# Patient Record
Sex: FEMALE | Race: WHITE
[De-identification: names, ages, dates, MRNs, and addresses within clinical notes are randomized per-mention and may not be internally consistent; named-entity substitution may affect disease eponyms.]

---

## 2018-01-05 ENCOUNTER — HOSPITAL ENCOUNTER (EMERGENCY)
Dept: HOSPITAL 62 - ER | Age: 38
Discharge: HOME | End: 2018-01-05
Payer: SELF-PAY

## 2018-01-05 VITALS — DIASTOLIC BLOOD PRESSURE: 84 MMHG | SYSTOLIC BLOOD PRESSURE: 117 MMHG

## 2018-01-05 DIAGNOSIS — R11.2: Primary | ICD-10-CM

## 2018-01-05 LAB
ADD MANUAL DIFF: NO
ALBUMIN SERPL-MCNC: 4.9 G/DL (ref 3.5–5)
ALP SERPL-CCNC: 77 U/L (ref 38–126)
ALT SERPL-CCNC: 23 U/L (ref 9–52)
ANION GAP SERPL CALC-SCNC: 15 MMOL/L (ref 5–19)
AST SERPL-CCNC: 27 U/L (ref 14–36)
BASOPHILS # BLD AUTO: 0 10^3/UL (ref 0–0.2)
BASOPHILS NFR BLD AUTO: 0.3 % (ref 0–2)
BILIRUB DIRECT SERPL-MCNC: 0.2 MG/DL (ref 0–0.4)
BILIRUB SERPL-MCNC: 0.5 MG/DL (ref 0.2–1.3)
BUN SERPL-MCNC: 12 MG/DL (ref 7–20)
CALCIUM: 10.8 MG/DL (ref 8.4–10.2)
CHLORIDE SERPL-SCNC: 100 MMOL/L (ref 98–107)
CO2 SERPL-SCNC: 26 MMOL/L (ref 22–30)
EOSINOPHIL # BLD AUTO: 0 10^3/UL (ref 0–0.6)
EOSINOPHIL NFR BLD AUTO: 0.2 % (ref 0–6)
ERYTHROCYTE [DISTWIDTH] IN BLOOD BY AUTOMATED COUNT: 14.3 % (ref 11.5–14)
GLUCOSE SERPL-MCNC: 90 MG/DL (ref 75–110)
HCT VFR BLD CALC: 43.1 % (ref 36–47)
HGB BLD-MCNC: 14.9 G/DL (ref 12–15.5)
LYMPHOCYTES # BLD AUTO: 2.8 10^3/UL (ref 0.5–4.7)
LYMPHOCYTES NFR BLD AUTO: 29.4 % (ref 13–45)
MCH RBC QN AUTO: 29.1 PG (ref 27–33.4)
MCHC RBC AUTO-ENTMCNC: 34.7 G/DL (ref 32–36)
MCV RBC AUTO: 84 FL (ref 80–97)
MONOCYTES # BLD AUTO: 0.4 10^3/UL (ref 0.1–1.4)
MONOCYTES NFR BLD AUTO: 4.3 % (ref 3–13)
NEUTROPHILS # BLD AUTO: 6.4 10^3/UL (ref 1.7–8.2)
NEUTS SEG NFR BLD AUTO: 65.8 % (ref 42–78)
PLATELET # BLD: 276 10^3/UL (ref 150–450)
POTASSIUM SERPL-SCNC: 4.4 MMOL/L (ref 3.6–5)
PROT SERPL-MCNC: 9.1 G/DL (ref 6.3–8.2)
RBC # BLD AUTO: 5.13 10^6/UL (ref 3.72–5.28)
SODIUM SERPL-SCNC: 141.1 MMOL/L (ref 137–145)
TOTAL CELLS COUNTED % (AUTO): 100 %
WBC # BLD AUTO: 9.7 10^3/UL (ref 4–10.5)

## 2018-01-05 PROCEDURE — 36415 COLL VENOUS BLD VENIPUNCTURE: CPT

## 2018-01-05 PROCEDURE — 85025 COMPLETE CBC W/AUTO DIFF WBC: CPT

## 2018-01-05 PROCEDURE — 99284 EMERGENCY DEPT VISIT MOD MDM: CPT

## 2018-01-05 PROCEDURE — 96374 THER/PROPH/DIAG INJ IV PUSH: CPT

## 2018-01-05 PROCEDURE — 96375 TX/PRO/DX INJ NEW DRUG ADDON: CPT

## 2018-01-05 PROCEDURE — 80053 COMPREHEN METABOLIC PANEL: CPT

## 2018-01-05 PROCEDURE — 96361 HYDRATE IV INFUSION ADD-ON: CPT

## 2018-01-18 ENCOUNTER — HOSPITAL ENCOUNTER (EMERGENCY)
Dept: HOSPITAL 62 - ER | Age: 38
Discharge: HOME | End: 2018-01-18
Payer: SELF-PAY

## 2018-01-18 VITALS — DIASTOLIC BLOOD PRESSURE: 89 MMHG | SYSTOLIC BLOOD PRESSURE: 124 MMHG

## 2018-01-18 DIAGNOSIS — R11.2: ICD-10-CM

## 2018-01-18 DIAGNOSIS — K08.89: Primary | ICD-10-CM

## 2018-01-18 DIAGNOSIS — F17.200: ICD-10-CM

## 2018-01-18 PROCEDURE — 99282 EMERGENCY DEPT VISIT SF MDM: CPT

## 2018-01-18 NOTE — ER DOCUMENT REPORT
ED General





- General


Chief Complaint: Toothache


Stated Complaint: NAUSEA,VOMITING


TRAVEL OUTSIDE OF THE [place] IN LAST 30 DAYS: No





- Related Data


Allergies/Adverse Reactions: 


 





ondansetron [From Zofran (as hydrochloride)] Allergy (Intermediate, Verified 01/ 18/18 10:49)


 Hives


acetaminophen [From Darvocet-N] Allergy (Verified 01/18/18 10:49)


 


meperidine [From Demerol] Allergy (Verified 01/18/18 10:49)


 


metoclopramide [From Reglan] Allergy (Verified 01/18/18 10:49)


 


propoxyphene [From Darvocet-N] Allergy (Verified 01/18/18 10:49)


 


erythromycin base [From E-Mycin] Adverse Reaction (Verified 01/18/18 10:49)


 











Past Medical History





- Social History


Smoking Status: Current Every Day Smoker


Chew tobacco use (# tins/day): No


Frequency of alcohol use: Occasional


Drug Abuse: Marijuana


Family History: Reviewed & Not Pertinent


Patient has suicidal ideation: No


Patient has homicidal ideation: No


Neurological Medical History: Reports: Hx Migraine


Renal/ Medical History: Denies: Hx Peritoneal Dialysis


Past Surgical History: Reports: Hx Appendectomy, Hx Cholecystectomy, Hx 

Hysterectomy, Hx Tonsillectomy





Physical Exam





- Vital signs


Vitals: 





 











Temp Pulse Resp BP Pulse Ox


 


 98.6 F   93   14   124/89 H  96 


 


 01/18/18 10:53  01/18/18 10:53  01/18/18 10:53  01/18/18 10:53  01/18/18 10:53














Course





- Vital Signs


Vital signs: 





 











Temp Pulse Resp BP Pulse Ox


 


 98.6 F   93   14   124/89 H  96 


 


 01/18/18 10:53  01/18/18 10:53  01/18/18 10:53  01/18/18 10:53  01/18/18 10:53














Discharge





- Discharge


Clinical Impression: 


 Pain, dental, Nausea





Condition: Stable


Disposition: HOME, SELF-CARE


Instructions:  Toothache ([facility])


Prescriptions: 


Penicillin V Potassium [Penicillin Vk 500 mg Tablet] 500 mg PO Q6 #40 tablet

## 2018-01-22 ENCOUNTER — HOSPITAL ENCOUNTER (EMERGENCY)
Dept: HOSPITAL 62 - ER | Age: 38
Discharge: HOME | End: 2018-01-22
Payer: SELF-PAY

## 2018-01-22 VITALS — SYSTOLIC BLOOD PRESSURE: 109 MMHG | DIASTOLIC BLOOD PRESSURE: 83 MMHG

## 2018-01-22 DIAGNOSIS — Z88.6: ICD-10-CM

## 2018-01-22 DIAGNOSIS — Z90.49: ICD-10-CM

## 2018-01-22 DIAGNOSIS — R03.0: ICD-10-CM

## 2018-01-22 DIAGNOSIS — K02.9: ICD-10-CM

## 2018-01-22 DIAGNOSIS — M77.32: Primary | ICD-10-CM

## 2018-01-22 DIAGNOSIS — Z90.710: ICD-10-CM

## 2018-01-22 DIAGNOSIS — F17.200: ICD-10-CM

## 2018-01-22 DIAGNOSIS — Z88.3: ICD-10-CM

## 2018-01-22 PROCEDURE — 99283 EMERGENCY DEPT VISIT LOW MDM: CPT

## 2018-01-22 NOTE — ER DOCUMENT REPORT
ED General





- General


Chief Complaint: Toothache


Stated Complaint: TOOTH/FOOT PAIN


Time Seen by Provider: 01/22/18 18:35


Mode of Arrival: Ambulatory


Information source: Patient


TRAVEL OUTSIDE OF THE [place] IN LAST 30 DAYS: No





- HPI


Onset: Other - few days


Quality of pain: Achy


Severity: Moderate


Notes: 





Patient arrives with 2 complaints today.  She was seen a few days ago for some 

dental pain was placed on penicillin and states that the abscesses in the gum 

since drained and she wanted to have these rechecked.  States that this seem to 

be improving.  She denies any difficulty breathing or swallowing.  She denies 

any fevers.  She also reports some heel pain in the left heel for last few 

days.  She denies any traumatic injuries or falls.  She denies any redness or 

swelling to this area.  She denies any nausea, vomiting, diarrhea.  She denies 

any chest pain or shortness of breath.  Pain is worse in her heel when she 

walks or touches the area, nothing seems to make it better.  She denies any 

other complaints at this time.





- Related Data


Allergies/Adverse Reactions: 


 





ondansetron [From Zofran (as hydrochloride)] Allergy (Intermediate, Verified 01/ 18/18 10:49)


 Hives


acetaminophen [From Darvocet-N] Allergy (Verified 01/18/18 10:49)


 


meperidine [From Demerol] Allergy (Verified 01/18/18 10:49)


 


metoclopramide [From Reglan] Allergy (Verified 01/18/18 10:49)


 


propoxyphene [From Darvocet-N] Allergy (Verified 01/18/18 10:49)


 


erythromycin base [From E-Mycin] Adverse Reaction (Verified 01/18/18 10:49)


 











Past Medical History





- Social History


Smoking Status: Current Every Day Smoker


Family History: Reviewed & Not Pertinent


Neurological Medical History: Reports: Hx Migraine


Renal/ Medical History: Denies: Hx Peritoneal Dialysis


Past Surgical History: Reports: Hx Appendectomy, Hx Cholecystectomy, Hx 

Hysterectomy, Hx Tonsillectomy





Review of Systems





- Review of Systems


-: Yes All other systems reviewed and negative





Physical Exam





- Vital signs


Vitals: 





 











Temp Pulse Resp BP Pulse Ox


 


 98.6 F   90   18   120/81   97 


 


 01/22/18 18:09  01/22/18 18:09  01/22/18 18:09  01/22/18 18:09  01/22/18 18:09














- Notes


Notes: 





GENERAL: alert, cooperative, nontoxic, no distress.


HEAD: normocephalic, atraumatic


EYES: conjunctiva pink without discharge, no external redness or swelling.


EARS: no external swelling, no external redness


NOSE: atraumatic, no external swelling


MOUTH/THROAT: mucous membranes moist and pink.  Widespread dental decay.  No 

abscess identified.  No facial swelling.  No trismus or drooling.


NECK: soft, supple, full range of motion, no meningismus.


CHEST: no distress, lungs clear and equal throughout.  No wheezing, rales, 

rhonchi.


CARDIAC: regular rate and rhythm, no murmur, normal capillary refill, normal 

pulses.  


BACK: full range of motion, no CVA tenderness.


EXTREMITIES: full range of motion of all extremities.  No redness, no swelling.

  Tenderness to palpation of the heel.  There is no redness or swelling.  Full 

range of motion.  No tenderness to palpation to the dorsum of the foot or the 

ankle.  Normal pulse and sensation to the foot and normal capillary refill.  No 

foreign body identified.


NEURO: alert and oriented 3, no focal deficits, full range of motion of all 

extremities.


PYSCH: appropriate mood, affect.  Patient is cooperative.


SKIN: pink, warm, dry, no rash.








Course





- Re-evaluation


Re-evalutation: 





01/22/18 19:24


The patient is nontoxic appearing with stable vitals.  The patient arrives for 

recheck of her dental pain.  She is currently on antibiotics and states that 

she feels like she is getting better and her exam is benign aside from 

widespread dental decay.  There is no distress or signs of worsening abscess at 

this time.  She also complains of some left heel pain.  Skin exam is benign 

with no redness or swelling.  Neurovascularly she is intact.  Compartments are 

soft.  X-ray shows a small heel spur with no other acute abnormalities.  This 

is the likely source of her pain.  She will be discharged home on NSAIDs.  

Follow-up if not improving the next week, sooner for increased pain, fever, 

redness, swelling, any further concerns.





The patient is noted to have elevated blood pressure during today's emergency 

department visit.  The patient was informed of this finding.  The patient was 

instructed that this may be related to pre-hypertension and requires further 

evaluation with a primary care provider.  The patient has no hypertensive 

symptoms at this time.





The patient's emergency department workup and current diagnosis were explained 

to the patient and or family.  Follow-up instructions were provided.  

Medications if prescribed were discussed. Instructions for when to return to 

the emergency department including specific  worrisome symptoms were discussed 

with the patient and/or family.





- Vital Signs


Vital signs: 





 











Temp Pulse Resp BP Pulse Ox


 


 98.6 F   90   18   120/81   97 


 


 01/22/18 18:09  01/22/18 18:09  01/22/18 18:09  01/22/18 18:09  01/22/18 18:09














- Diagnostic Test


Radiology reviewed: Image reviewed - No acute bony abnormality.  Heel spur 

noted.





Discharge





- Discharge


Clinical Impression: 


 Dental caries





Heel spur


Qualifiers:


 Laterality: left Qualified Code(s): M77.32 - Calcaneal spur, left foot





Condition: Stable


Disposition: HOME, SELF-CARE


Instructions:  Toothache ([facility]), [facility], Dentist, Plantar 

Fasciitis or Heel Spur ([facility])


Additional Instructions: 


Take medications as prescribed.  Stay off her foot as much as possible.  Apply 

ice to sore area.  Follow-up if not better in 1 week, sooner for increased pain

, fever, redness, numbness, tingling, weakness, any further concerns.





Your blood pressure was elevated during today's visit.  Have this rechecked 

with your doctor.


Prescriptions: 


Naproxen [Naprosyn] 500 mg PO BID #20 tablet


Forms:  Elevated Blood Pressure, Smoking Cessation Education


Referrals: 


[facility] [Provider Group] - Follow up as needed


[facility] [Provider Group] - Follow up as needed


[facility] [ACTIVE STAFF] - Follow up as needed


N[facility] [ACTIVE STAFF] - Follow up as needed


CHRISTIANO NEWMAN [facility] [ACTIVE STAFF] - Follow up as needed


[facility] HERNANDEZ [facility] [ACTIVE STAFF] - Follow up as needed

## 2018-01-22 NOTE — RADIOLOGY REPORT (SQ)
EXAM DESCRIPTION:  FOOT LEFT COMPLETE



COMPLETED DATE/TIME:  1/22/2018 7:01 pm



REASON FOR STUDY:  left heel pain



COMPARISON:  None.



NUMBER OF VIEWS:  Three views.



TECHNIQUE:  AP, lateral and oblique without weight bearing radiographic images acquired of the left f
oot.



LIMITATIONS:  None.



FINDINGS:  MINERALIZATION: Normal.

BONES: No acute fracture or dislocation. No worrisome bone lesions. Small heel spur.

JOINTS: No erosions.  No raul-articular osteopenia.  No chondrocalcinosis.

SOFT TISSUES: No swelling.  No calcifications.

OTHER: No other significant finding.



IMPRESSION:  SMALL HEEL SPUR.  NO OTHER SIGNIFICANT FINDINGS.



TECHNICAL DOCUMENTATION:  JOB ID:  [id number]

 2011 [employer]- All Rights Reserved

## 2018-02-26 ENCOUNTER — HOSPITAL ENCOUNTER (EMERGENCY)
Dept: HOSPITAL 62 - ER | Age: 38
Discharge: HOME | End: 2018-02-26
Payer: SELF-PAY

## 2018-02-26 VITALS — DIASTOLIC BLOOD PRESSURE: 96 MMHG | SYSTOLIC BLOOD PRESSURE: 128 MMHG

## 2018-02-26 DIAGNOSIS — R10.84: Primary | ICD-10-CM

## 2018-02-26 DIAGNOSIS — Z88.6: ICD-10-CM

## 2018-02-26 DIAGNOSIS — Z88.5: ICD-10-CM

## 2018-02-26 DIAGNOSIS — Z90.49: ICD-10-CM

## 2018-02-26 DIAGNOSIS — Z90.710: ICD-10-CM

## 2018-02-26 DIAGNOSIS — Z88.8: ICD-10-CM

## 2018-02-26 LAB
ADD MANUAL DIFF: NO
ALBUMIN SERPL-MCNC: 4.8 G/DL (ref 3.5–5)
ALP SERPL-CCNC: 57 U/L (ref 38–126)
ALT SERPL-CCNC: 20 U/L (ref 9–52)
ANION GAP SERPL CALC-SCNC: 12 MMOL/L (ref 5–19)
APPEARANCE UR: CLEAR
APTT PPP: (no result) S
AST SERPL-CCNC: 22 U/L (ref 14–36)
BASOPHILS # BLD AUTO: 0 10^3/UL (ref 0–0.2)
BASOPHILS NFR BLD AUTO: 0.4 % (ref 0–2)
BILIRUB DIRECT SERPL-MCNC: 0.2 MG/DL (ref 0–0.4)
BILIRUB SERPL-MCNC: 0.3 MG/DL (ref 0.2–1.3)
BILIRUB UR QL STRIP: NEGATIVE
BUN SERPL-MCNC: 8 MG/DL (ref 7–20)
CALCIUM: 10.1 MG/DL (ref 8.4–10.2)
CHLORIDE SERPL-SCNC: 99 MMOL/L (ref 98–107)
CO2 SERPL-SCNC: 31 MMOL/L (ref 22–30)
EOSINOPHIL # BLD AUTO: 0.1 10^3/UL (ref 0–0.6)
EOSINOPHIL NFR BLD AUTO: 1.2 % (ref 0–6)
ERYTHROCYTE [DISTWIDTH] IN BLOOD BY AUTOMATED COUNT: 14.3 % (ref 11.5–14)
GLUCOSE SERPL-MCNC: 85 MG/DL (ref 75–110)
GLUCOSE UR STRIP-MCNC: NEGATIVE MG/DL
HCT VFR BLD CALC: 39.6 % (ref 36–47)
HGB BLD-MCNC: 13.5 G/DL (ref 12–15.5)
KETONES UR STRIP-MCNC: NEGATIVE MG/DL
LIPASE SERPL-CCNC: 51.5 U/L (ref 23–300)
LYMPHOCYTES # BLD AUTO: 3.4 10^3/UL (ref 0.5–4.7)
LYMPHOCYTES NFR BLD AUTO: 53.9 % (ref 13–45)
MCH RBC QN AUTO: 29.2 PG (ref 27–33.4)
MCHC RBC AUTO-ENTMCNC: 34.1 G/DL (ref 32–36)
MCV RBC AUTO: 85 FL (ref 80–97)
MONOCYTES # BLD AUTO: 0.3 10^3/UL (ref 0.1–1.4)
MONOCYTES NFR BLD AUTO: 4.6 % (ref 3–13)
NEUTROPHILS # BLD AUTO: 2.5 10^3/UL (ref 1.7–8.2)
NEUTS SEG NFR BLD AUTO: 39.9 % (ref 42–78)
NITRITE UR QL STRIP: NEGATIVE
PH UR STRIP: 9 [PH] (ref 5–9)
PLATELET # BLD: 287 10^3/UL (ref 150–450)
POTASSIUM SERPL-SCNC: 4.6 MMOL/L (ref 3.6–5)
PROT SERPL-MCNC: 8.2 G/DL (ref 6.3–8.2)
PROT UR STRIP-MCNC: NEGATIVE MG/DL
RBC # BLD AUTO: 4.64 10^6/UL (ref 3.72–5.28)
SODIUM SERPL-SCNC: 142.2 MMOL/L (ref 137–145)
SP GR UR STRIP: 1.01
TOTAL CELLS COUNTED % (AUTO): 100 %
UROBILINOGEN UR-MCNC: NEGATIVE MG/DL (ref ?–2)
WBC # BLD AUTO: 6.3 10^3/UL (ref 4–10.5)

## 2018-02-26 PROCEDURE — 85025 COMPLETE CBC W/AUTO DIFF WBC: CPT

## 2018-02-26 PROCEDURE — 81001 URINALYSIS AUTO W/SCOPE: CPT

## 2018-02-26 PROCEDURE — 96361 HYDRATE IV INFUSION ADD-ON: CPT

## 2018-02-26 PROCEDURE — 80053 COMPREHEN METABOLIC PANEL: CPT

## 2018-02-26 PROCEDURE — 96372 THER/PROPH/DIAG INJ SC/IM: CPT

## 2018-02-26 PROCEDURE — 36415 COLL VENOUS BLD VENIPUNCTURE: CPT

## 2018-02-26 PROCEDURE — 83690 ASSAY OF LIPASE: CPT

## 2018-02-26 PROCEDURE — 99283 EMERGENCY DEPT VISIT LOW MDM: CPT

## 2018-02-26 PROCEDURE — 74022 RADEX COMPL AQT ABD SERIES: CPT

## 2018-02-26 PROCEDURE — 96374 THER/PROPH/DIAG INJ IV PUSH: CPT

## 2018-02-26 PROCEDURE — 81025 URINE PREGNANCY TEST: CPT

## 2018-02-26 NOTE — ER DOCUMENT REPORT
ED Medical Screen (RME)





- General


Chief Complaint: Vomiting


Stated Complaint: VOMITING


Time Seen by Provider: 02/26/18 10:33


Notes: 





pt in methadone clinic(went this am) has had vomiting for "months" but worse 

last 4 days.  has had hyster, migel, appy. denies drug/etoch use for last yr.


TRAVEL OUTSIDE OF THE [place] IN LAST 30 DAYS: No





- Related Data


Allergies/Adverse Reactions: 


 





ondansetron [From Zofran (as hydrochloride)] Allergy (Intermediate, Verified 02/ 26/18 10:28)


 Hives


acetaminophen [From Darvocet-N] Allergy (Verified 02/26/18 10:28)


 


meperidine [From Demerol] Allergy (Verified 02/26/18 10:28)


 


metoclopramide [From Reglan] Allergy (Verified 02/26/18 10:28)


 


propoxyphene [From Darvocet-N] Allergy (Verified 02/26/18 10:28)


 


erythromycin base [From E-Mycin] Adverse Reaction (Verified 02/26/18 10:28)


 











Past Medical History





- Social History


Frequency of alcohol use: None


Drug Abuse: None


Neurological Medical History: Reports: Hx Migraine


Renal/ Medical History: Denies: Hx Peritoneal Dialysis


Past Surgical History: Reports: Hx Appendectomy, Hx Cholecystectomy, Hx 

Hysterectomy, Hx Tonsillectomy





Physical Exam





- Vital signs


Vitals: 





 











Temp Pulse Resp BP Pulse Ox


 


 99.2 F   78   18   119/87 H  97 


 


 02/26/18 10:08  02/26/18 10:08  02/26/18 10:08  02/26/18 10:08  02/26/18 10:08














Course





- Vital Signs


Vital signs: 





 











Temp Pulse Resp BP Pulse Ox


 


 99.2 F   78   18   119/87 H  97 


 


 02/26/18 10:08  02/26/18 10:08  02/26/18 10:08  02/26/18 10:08  02/26/18 10:08

## 2018-02-26 NOTE — ER DOCUMENT REPORT
ED GI/





- General


Chief Complaint: Vomiting


Stated Complaint: VOMITING


Time Seen by Provider: 02/26/18 10:33


Mode of Arrival: Ambulatory


Information source: Patient


TRAVEL OUTSIDE OF THE [place] IN LAST 30 DAYS: No





- HPI


Patient complains to provider of: Abdominal pain


Onset: Yesterday


Timing/Duration: Gradual


Quality of pain: Achy


Severity at maximum: Moderate


Pain Level: 4


Context: denies: Bad food, Lifting, Out of the country travel, Pregnant, Recent 

trauma, Other


Location: No: Chest pain, Epigastric, LUQ, LLQ, RUQ, RLQ, Left flank, Right 

flank, Low back, Suprapubic, Pelvis, Vaginal, Vulvar, Rectal, Other


Vaginal bleeding (Compared to normal period): denies: None, Spotting, Lighter, 

Similar, Heavier, Severe, Bright red, Dark brown, Passing clots, Passing tissue





- Related Data


Allergies/Adverse Reactions: 


 





ondansetron [From Zofran (as hydrochloride)] Allergy (Intermediate, Verified 02/ 26/18 10:28)


 Hives


acetaminophen [From Darvocet-N] Allergy (Verified 02/26/18 10:28)


 


meperidine [From Demerol] Allergy (Verified 02/26/18 10:28)


 


metoclopramide [From Reglan] Allergy (Verified 02/26/18 10:28)


 


propoxyphene [From Darvocet-N] Allergy (Verified 02/26/18 10:28)


 


erythromycin base [From E-Mycin] Adverse Reaction (Verified 02/26/18 10:28)


 











Past Medical History





- General


Information source: Patient





- Social History


Smoking Status: Never Smoker


Frequency of alcohol use: None


Drug Abuse: None, Other - Past history of drug abuse


Family History: Reviewed & Not Pertinent


Patient has suicidal ideation: No


Patient has homicidal ideation: No





- Past Medical History


Cardiac Medical History: 


   Denies: None, Hx Atrial Fibrillation, Hx Congestive Heart Failure, Hx 

Coronary Artery Disease, Hx DVT, Hx Heart Attack, Hx Hypercholesterolemia, Hx 

Hypertension, Hx Peripheral Vascular Disease, Hx Pulmonary Embolism, Hx Heart 

Murmur, Other


Pulmonary Medical History: 


   Denies: None, Hx Asthma, Hx Bronchitis, Hx COPD, Hx Pneumonia, Hx Intubation

, Hx Respiratory Failure, Hx Sleep Apnea, Hx Tuberculosis, Other


EENT Medical History: 


   Denies: None, Eyes, Ears, Nose, Throat, Other


Neurological Medical History: Reports: Hx Migraine.  Denies: None, Hx 

Cerebrovascular Accident, Hx Seizures, Other


Endocrine Medical History: Denies: None, Hx Diabetes Mellitus Type 1, Hx 

Diabetes Mellitus Type 2, Hx Graves' Disease, Hx Hyperthyroidism, Hx 

Hypothyroidism, Other


Renal/ Medical History: Denies: Hx Peritoneal Dialysis


Past Surgical History: Reports: Hx Appendectomy, Hx Cholecystectomy, Hx 

Hysterectomy, Hx Tonsillectomy





Review of Systems





- Review of Systems


Constitutional: denies: No symptoms reported, See HPI, Chills, Diaphoresis, 

Fever, Malaise, Weakness, Other, Weight gain, Weight loss, Recent illness


EENT: denies: No symptoms reported, See HPI, Eye pain, Eye discharge, Blurred 

vision, Tearing, Double vision, Ear pain, Ear discharge, Nose pain, Nose 

congestion, Nose discharge, Sinus pressure, Sinus discharge, Throat pain, 

Difficulty swallowing, Throat swelling, Mouth pain, Mouth swelling, Dental 

problem, Vertigo, Other


Cardiovascular: denies: No symptoms reported, See HPI, Chest pain, Palpitations

, Heart racing, Orthopnea, Dyspnea, Syncope, Dizziness, Lightheaded, Edema, 

Other, Paroxysmal Nocturnal Dysp


Respiratory: denies: No symptoms reported, See HPI, Cough, Hurts to breathe, 

Hemoptysis, Short of breath, Sputum, Stridor, Wheezing, Other


Gastrointestinal: See HPI


Genitourinary: denies: No symptoms reported, See HPI, Burning, Dysuria, 

Discharge, Frequency, Flank pain, Hematuria, Incontinence, Pain, Urgency, 

Retention, Other





Physical Exam





- Vital signs


Vitals: 


 











Temp Pulse Resp BP Pulse Ox


 


 99.2 F   78   18   119/87 H  97 


 


 02/26/18 10:08  02/26/18 10:08  02/26/18 10:08  02/26/18 10:08  02/26/18 10:08














- Notes


Notes: 





PHYSICAL EXAMINATION:





GENERAL: Well-appearing, well-nourished and i mild to moderate discomfort





HEAD: Atraumatic, normocephalic.





EYES: Pupils equal round and reactive to light, extraocular movements intact, 

conjunctiva are normal.





ENT: Nares patent, oropharynx clear without exudates.  Moist mucous membranes.





NECK: Normal range of motion, supple without lymphadenopathy





LUNGS: Breath sounds clear to auscultation bilaterally and equal.  No wheezes 

rales or rhonchi.





HEART: Regular rate and rhythm without murmurs





ABDOMEN: Soft, nontender, nondistended abdomen.  No guarding, no rebound.  No 

masses appreciated.





Female : deferred





Musculoskeletal: Normal range of motion, no pitting or edema.  No cyanosis.





NEUROLOGICAL: Cranial nerves grossly intact.  Normal speech, normal gait.  

Normal sensory, motor exams





PSYCH: Normal mood, normal affect.





SKIN: Warm, Dry, normal turgor, no rashes or lesions noted.





Course





- Re-evaluation


Re-evalutation: 





02/26/18 14:43


Multiple doses of antiemetics were given, we could not dispense methadone as we 

are not licensed to do so.  Subsequently patient was discharged home.





- Vital Signs


Vital signs: 


 











Temp Pulse Resp BP Pulse Ox


 


 99.2 F   78   18   119/87 H  97 


 


 02/26/18 10:08  02/26/18 10:08  02/26/18 10:08  02/26/18 10:08  02/26/18 10:08














- Laboratory


Result Diagrams: 


 02/26/18 11:00





 02/26/18 11:00


Laboratory results interpreted by me: 


 











  02/26/18 02/26/18





  11:00 11:00


 


RDW  14.3 H 


 


Seg Neutrophils %  39.9 L 


 


Lymphocytes %  53.9 H 


 


Carbon Dioxide   31 H














- Diagnostic Test


Radiology reviewed: Reports reviewed - Reported by radiologist as normal





Discharge





- Discharge


Clinical Impression: 


 Nausea





Abdominal pain


Qualifiers:


 Abdominal location: generalized Qualified Code(s): R10.84 - Generalized 

abdominal pain





Condition: Fair


Disposition: HOME, SELF-CARE


Instructions:  Antinausea Medication ([facility])


Prescriptions: 


Promethazine HCl 50 mg PO BID PRN #20 tablet


 PRN Reason:

## 2018-02-26 NOTE — RADIOLOGY REPORT (SQ)
EXAM DESCRIPTION:  ACUTE ABDOMEN SERIES



COMPLETED DATE/TIME:  2/26/2018 11:58 am



REASON FOR STUDY:  Abdominal pain



COMPARISON:  None.



NUMBER OF VIEWS:  Three views.



TECHNIQUE:  Frontal chest, supine abdomen and upright/decubitus abdomen radiographic images acquired.




LIMITATIONS:  None.



FINDINGS:  CHEST: Lungs clear of infiltrates.

FREE AIR: None. No abnormal gas collections.

BOWEL GAS PATTERN: Moderate amount of fecal material in the proximal colon.  Nonobstructive pattern. 
No dilated loops or air fluid levels.

CALCIFICATIONS: No suspicious calcifications.  Multiple calcifications in the pelvis consistent with 
phleboliths.

HARDWARE: Multiple surgical clips in the region of the gallbladder and appendix.

SOFT TISSUES: No gross mass or suggestion of organomegaly.

BONES: No acute fracture.  No worrisome bone lesions.

OTHER: No other significant finding.



IMPRESSION:  NO RADIOGRAPHIC EVIDENCE FOR ACUTE ABDOMINAL DISEASE.



TECHNICAL DOCUMENTATION:  JOB ID:  [id number]

 2011 [employer]- All Rights Reserved



Reading location - IP/workstation name: [email or web address]

## 2018-04-26 ENCOUNTER — HOSPITAL ENCOUNTER (EMERGENCY)
Dept: HOSPITAL 62 - ER | Age: 38
Discharge: HOME | End: 2018-04-26
Payer: SELF-PAY

## 2018-04-26 VITALS — SYSTOLIC BLOOD PRESSURE: 111 MMHG | DIASTOLIC BLOOD PRESSURE: 76 MMHG

## 2018-04-26 DIAGNOSIS — B96.89: ICD-10-CM

## 2018-04-26 DIAGNOSIS — J01.80: Primary | ICD-10-CM

## 2018-04-26 DIAGNOSIS — R09.81: ICD-10-CM

## 2018-04-26 DIAGNOSIS — R05: ICD-10-CM

## 2018-04-26 DIAGNOSIS — R50.9: ICD-10-CM

## 2018-04-26 DIAGNOSIS — Z90.710: ICD-10-CM

## 2018-04-26 LAB
ADD MANUAL DIFF: NO
ALBUMIN SERPL-MCNC: 3.9 G/DL (ref 3.5–5)
ALP SERPL-CCNC: 54 U/L (ref 38–126)
ALT SERPL-CCNC: 21 U/L (ref 9–52)
ANION GAP SERPL CALC-SCNC: 11 MMOL/L (ref 5–19)
AST SERPL-CCNC: 25 U/L (ref 14–36)
BASOPHILS # BLD AUTO: 0 10^3/UL (ref 0–0.2)
BASOPHILS NFR BLD AUTO: 0.6 % (ref 0–2)
BILIRUB DIRECT SERPL-MCNC: 0.2 MG/DL (ref 0–0.4)
BILIRUB SERPL-MCNC: 0.2 MG/DL (ref 0.2–1.3)
BUN SERPL-MCNC: 9 MG/DL (ref 7–20)
CALCIUM: 9 MG/DL (ref 8.4–10.2)
CHLORIDE SERPL-SCNC: 101 MMOL/L (ref 98–107)
CO2 SERPL-SCNC: 29 MMOL/L (ref 22–30)
EOSINOPHIL # BLD AUTO: 0.1 10^3/UL (ref 0–0.6)
EOSINOPHIL NFR BLD AUTO: 1.6 % (ref 0–6)
ERYTHROCYTE [DISTWIDTH] IN BLOOD BY AUTOMATED COUNT: 14.9 % (ref 11.5–14)
GLUCOSE SERPL-MCNC: 112 MG/DL (ref 75–110)
HCT VFR BLD CALC: 36.1 % (ref 36–47)
HGB BLD-MCNC: 12.5 G/DL (ref 12–15.5)
LYMPHOCYTES # BLD AUTO: 2.7 10^3/UL (ref 0.5–4.7)
LYMPHOCYTES NFR BLD AUTO: 45.7 % (ref 13–45)
MCH RBC QN AUTO: 30.3 PG (ref 27–33.4)
MCHC RBC AUTO-ENTMCNC: 34.5 G/DL (ref 32–36)
MCV RBC AUTO: 88 FL (ref 80–97)
MONOCYTES # BLD AUTO: 0.4 10^3/UL (ref 0.1–1.4)
MONOCYTES NFR BLD AUTO: 7.5 % (ref 3–13)
NEUTROPHILS # BLD AUTO: 2.6 10^3/UL (ref 1.7–8.2)
NEUTS SEG NFR BLD AUTO: 44.6 % (ref 42–78)
PLATELET # BLD: 243 10^3/UL (ref 150–450)
POTASSIUM SERPL-SCNC: 3.8 MMOL/L (ref 3.6–5)
PROT SERPL-MCNC: 7.1 G/DL (ref 6.3–8.2)
RBC # BLD AUTO: 4.12 10^6/UL (ref 3.72–5.28)
SODIUM SERPL-SCNC: 140.7 MMOL/L (ref 137–145)
TOTAL CELLS COUNTED % (AUTO): 100 %
WBC # BLD AUTO: 5.9 10^3/UL (ref 4–10.5)

## 2018-04-26 PROCEDURE — 96374 THER/PROPH/DIAG INJ IV PUSH: CPT

## 2018-04-26 PROCEDURE — 36415 COLL VENOUS BLD VENIPUNCTURE: CPT

## 2018-04-26 PROCEDURE — 80053 COMPREHEN METABOLIC PANEL: CPT

## 2018-04-26 PROCEDURE — 99284 EMERGENCY DEPT VISIT MOD MDM: CPT

## 2018-04-26 PROCEDURE — 94640 AIRWAY INHALATION TREATMENT: CPT

## 2018-04-26 PROCEDURE — 85025 COMPLETE CBC W/AUTO DIFF WBC: CPT

## 2018-04-26 PROCEDURE — 84703 CHORIONIC GONADOTROPIN ASSAY: CPT

## 2018-04-26 PROCEDURE — S0183 PROCHLORPERAZINE 5 MG: HCPCS

## 2018-04-26 PROCEDURE — 71046 X-RAY EXAM CHEST 2 VIEWS: CPT

## 2018-04-26 NOTE — ER DOCUMENT REPORT
ED General





- General


Chief Complaint: Congestion


Stated Complaint: COLD SYMPTOMS


Time Seen by Provider: 04/26/18 09:03


Mode of Arrival: Ambulatory


Information source: Patient


TRAVEL OUTSIDE OF THE [place] IN LAST 30 DAYS: No





- HPI


Notes: 





37-year-old female presents today with complaints of coughing for 1 week, fever

, sinus pain and wheezing that started 2 days ago.  Patient also reports she 

feels like she could be pregnant, reports weight gain, breast tenderness and 

noticed she is more tesfaye.  Patient did have a partial hysterectomy but is 

sexually active without using any form of birth control.  Denies any 

hemoptysis.  Patient reports she does not smoke no history of asthma or COPD.  

Tried over-the-counter DayQuil without relief.  Denies fevers, chills,  chest 

pain,palpitations,  shortness of breath, dyspnea, nausea, vomiting, diarrhea, 

abdominal pain, hematuria,blurred vision, double vision, loss of vision, speech 

changes, LH, dizziness, syncope, headaches, ST, neck pain, weakness, bowel or 

bladder dysfunction, saddle anesthesia, numbness or tingling in bilateral upper 

or lower extremities equally, muscle paralysis, weakness in bilateral upper or 

lower extremities equally or rash. Denies IV drug use.





- Related Data


Allergies/Adverse Reactions: 


 





ondansetron [From Zofran (as hydrochloride)] Allergy (Intermediate, Verified 04/ 26/18 08:54)


 Hives


acetaminophen [From Darvocet-N] Allergy (Verified 04/26/18 08:54)


 


meperidine [From Demerol] Allergy (Verified 04/26/18 08:54)


 


metoclopramide [From Reglan] Allergy (Verified 04/26/18 08:54)


 


propoxyphene [From Darvocet-N] Allergy (Verified 04/26/18 08:54)


 


erythromycin base [From E-Mycin] Adverse Reaction (Verified 04/26/18 08:54)


 











Past Medical History





- General


Information source: Patient





- Social History


Smoking Status: Unknown if Ever Smoked


Frequency of alcohol use: None


Drug Abuse: None


Family History: Reviewed & Not Pertinent


Patient has suicidal ideation: No


Patient has homicidal ideation: No





- Past Medical History


Cardiac Medical History: 


   Denies: Hx Atrial Fibrillation, Hx Congestive Heart Failure, Hx Coronary 

Artery Disease, Hx DVT, Hx Heart Attack, Hx Hypercholesterolemia, Hx 

Hypertension, Hx Peripheral Vascular Disease, Hx Pulmonary Embolism, Hx Heart 

Murmur


Pulmonary Medical History: 


   Denies: Hx Asthma, Hx Bronchitis, Hx COPD, Hx Pneumonia, Hx Intubation, Hx 

Respiratory Failure, Hx Sleep Apnea, Hx Tuberculosis


Neurological Medical History: Reports: Hx Migraine.  Denies: Hx Cerebrovascular 

Accident, Hx Seizures


Endocrine Medical History: Denies: Hx Diabetes Mellitus Type 1, Hx Diabetes 

Mellitus Type 2, Hx Graves' Disease, Hx Hyperthyroidism, Hx Hypothyroidism


Renal/ Medical History: Denies: Hx Peritoneal Dialysis


Past Surgical History: Reports: Hx Appendectomy, Hx Cholecystectomy, Hx 

Hysterectomy, Hx Tonsillectomy





Review of Systems





- Review of Systems


Constitutional: See HPI


EENT: No symptoms reported


Cardiovascular: No symptoms reported


Respiratory: See HPI


Gastrointestinal: No symptoms reported


Genitourinary: No symptoms reported


Female Genitourinary: No symptoms reported


Musculoskeletal: No symptoms reported


Skin: No symptoms reported


Hematologic/Lymphatic: No symptoms reported


Neurological/Psychological: No symptoms reported





Physical Exam





- Vital signs


Vitals: 


 











Temp Pulse Resp BP Pulse Ox


 


 98.4 F   88   14   103/64   96 


 


 04/26/18 08:36  04/26/18 08:36  04/26/18 08:36  04/26/18 08:36  04/26/18 08:36














- Notes


Notes: 





PHYSICAL EXAMINATION:





GENERAL: Well-appearing, well-nourished and in no acute distress.





HEAD: Atraumatic, normocephalic.





EYES: Pupils equal round and reactive to light, extraocular movements intact, 

conjunctiva are normal.





ENT: Nares patent, oropharynx clear without exudates.  Moist mucous membranes.  

Tenderness to right maxillary sinus palpation.  TMs bilaterally with effusion, 

no erythema, perforation bilaterally.





NECK: Normal range of motion, supple without lymphadenopathy





LUNGS: bilateral wheezing with decreased breath sounds in bilateral upper 

lobes.  Slightly resolved after breathing treatment no rales or rhonchi.





HEART: Regular rate and rhythm without murmurs





ABDOMEN: Soft, nontender, nondistended abdomen.  No guarding, no rebound.  No 

masses appreciated.





Female : deferred





Musculoskeletal: Normal range of motion, no pitting or edema.  No cyanosis.





NEUROLOGICAL: Cranial nerves grossly intact.  Normal speech, normal gait.  

Normal sensory, motor exams





PSYCH: Normal mood, normal affect.





SKIN: Warm, Dry, normal turgor, no rashes or lesions noted.





Course





- Re-evaluation


Re-evalutation: 








37-year-old female is afebrile and vitals are stable and she is in no distress. 

cbc for leukocytosis and anemia, CMP negative for renal or hepatic dysfunction, 

no electrolyte disturbances. cxr neagtive per radiologist. hcg negative.  

Patient given to antiemetics for nausea, patient does take methadone for her 

substance abuse.  Patient has a long history of nausea.  Patient has not had 

any vomiting or diarrhea, this likely a chronic issue.  125 Solu-Medrol IM.  On 

reevaluation patient states that she does feel better.  We will send her home 

with Augmentin for acute sinusitis, prednisone, Tessalon Perles and Ventolin 

inhaler for her upper respiratory infection.   At this time will discharge with 

return precautions and follow-up recommendations.  Verbal discharge 

instructions given a the bedside and opportunity for questions given. 

Medication warnings reviewed. Patient is in agreement with this plan and has 

verbalized understanding of return precautions and the need for primary care 

follow-up in the next 24-72 hours.











After performing a Medical Screening Examination, I estimate there is LOW risk 

for ACUTE CORONARY SYNDROME, PULMONARY EMBOLI, RESPIRATORY FAILURE, SEPSIS OR 

MENINGITIS, thus I consider the discharge disposition reasonable.  I have 

reevaluated this patient multiple times and no significant life threatening 

changes are noted. The patient and I have discussed the diagnosis and risks, 

and we agree with discharging home with close follow-up. We also discussed 

returning to the Emergency Department immediately if new or worsening symptoms 

occur. We have discussed the symptoms which are most concerning (e.g., changing 

or worsening pain, trouble swallowing or breathing, neck stiffness, fever) that 

necessitate immediate return.











After performing a Medical Screening Examination, I estimate there is LOW risk 

for a DEEP SPACE INFECTION (e.g., SAW'S ANGINA OR RETROPHARYNGEAL ABSCESS), 

MENINGITIS, INTRACRANIAL HEMORRHAGE, or AIRWAY COMPROMISE, thus I consider the 

discharge disposition reasonable. Also, there is no evidence or peritonitis, 

sepsis, or toxicity.  I have reevaluated this patient multiple times and no 

significant life threatening changes are noted. The patient and I have 

discussed the diagnosis and risks, and we agree with discharging home with 

close follow-up with the understanding that symptoms and presentations can 

change. We also discussed returning to the Emergency Department immediately if 

new or worsening symptoms occur. We have discussed the symptoms which are most 

concerning (e.g., changing or worsening pain, trouble swallowing or breathing, 

neck stiffness or fever) that necessitate immediate return.








- Vital Signs


Vital signs: 


 











Temp Pulse Resp BP Pulse Ox


 


 98.6 F   82   18   111/76   95 


 


 04/26/18 12:00  04/26/18 12:00  04/26/18 12:00  04/26/18 12:00  04/26/18 12:00














- Laboratory


Result Diagrams: 


 04/26/18 10:24





 04/26/18 10:24


Laboratory results interpreted by me: 


 











  04/26/18 04/26/18





  10:24 10:24


 


RDW  14.9 H 


 


Lymphocytes %  45.7 H 


 


Glucose   112 H














Discharge





- Discharge


Clinical Impression: 


 Acute bacterial sinusitis, Cough





Condition: Good


Disposition: HOME, SELF-CARE


Instructions:  Cough Suppressant & Expectorant Medications, Sinusitis ([facility])


Additional Instructions: 


Cough Suppressant/Expectorant Medication





  


   You are to use a cough medication as needed for relief of symptoms.  This 

medicine is a combination of an expectorant (to make the mucous thinner and 

more easily "coughed up") and a cough suppressant (to reduce the frequency of 

coughing).


     The cough-suppressant medicine is related to narcotics.  You may 

experience mild nausea and sleepiness.  Some patients who are very sensitive to 

narcotics may have stomach pain from this medicine. Taking the medicine with 

food reduces these side effects.  Do not drive or work with machinery until you 

know how this medicine affects you.


     The expectorant should have no side effects.  Iodine-containing 

expectorants (such as organidin) should not be taken by persons with active 

thyroid disease unless approved by your doctor.


     Call the doctor if you develop shortness of breath, hives, rash, itching, 

lightheadedness, or severe nausea and vomiting.








Sinusitis





     You have sinusitis, an infection of the sinus cavities of the face.  The 

sinuses are air-filled chambers which open into the inside of the nose.  

Bacteria and pus fill a sinus, causing pain, drainage, and fever.


     Sinusitis is treated with antibiotics.  Often, expectorants (to thin the 

sinus mucous) or decongestants (to reduce swelling) are prescribed as well.  

Healing requires seven to 10 days.


     Avoid chemical fumes, pollens, dusts, and smoke (especially cigarette smoke

).  Keep the air humidified in your bedroom and work area and take plenty of 

liquids by mouth.


     This condition can be serious if the infection spreads.  If your symptoms 

worsen, or if you develop severe headache, high fever, stiff neck, or a rash, 

you must call the doctor or return for re-evaluation.





use like Ventolin inhaler as needed.  Take prednisone, augmentin and tessalon 

perls directed with food  Take ibuprofen and Tylenol as needed for any fever 

pain.  Increase oral hydration.  Follow-up with primary care provider within 3 

days.  Return to ER symptoms become worse.





Return immediately for any new or worsening symptoms.





Follow up with primary care provider, call tomorrow to make followup 

appointment.


Prescriptions: 


Benzonatate [Tessalon Perles 100 mg Capsule] 100 mg PO Q8HP PRN #20 capsule


 PRN Reason: 


Albuterol Sulfate [Ventolin Hfa] 1 - 2 puff IH Q4 PRN #1 [id number].[id number]


 PRN Reason: 


Amox Tr/Potassium Clavulanate [Augmentin 875-125 Tablet] 1 tab PO BID 10 Days #

20 tablet


Prednisone [Deltasone 20 mg Tablet] 3 tab PO DAILY 5 Days #15 tablet


Referrals: 


BRIANA MARK MD [ACTIVE STAFF] - Follow up in 3-5 days

## 2018-04-26 NOTE — RADIOLOGY REPORT (SQ)
EXAM DESCRIPTION:  CHEST 2 VIEWS



COMPLETED DATE/TIME:  4/26/2018 10:07 am



REASON FOR STUDY:  cough



COMPARISON:  None.



EXAM PARAMETERS:  NUMBER OF VIEWS: two views

TECHNIQUE: Digital Frontal and Lateral radiographic views of the chest acquired.

RADIATION DOSE: NA

LIMITATIONS: none



FINDINGS:  LUNGS AND PLEURA: No opacities, masses or pneumothorax. No pleural effusion.

MEDIASTINUM AND HILAR STRUCTURES: No masses or contour abnormalities.

HEART AND VASCULAR STRUCTURES: Heart normal size.  No evidence for failure.

BONES: No acute findings.

HARDWARE: Clips in the upper abdomen.

OTHER: No other significant finding.



IMPRESSION:  NO ACUTE RADIOGRAPHIC FINDING IN THE CHEST.



TECHNICAL DOCUMENTATION:  JOB ID:  [id number]

 2011 [employer]- All Rights Reserved



Reading location - IP/workstation name: [facility]

## 2018-07-12 ENCOUNTER — HOSPITAL ENCOUNTER (EMERGENCY)
Dept: HOSPITAL 62 - ER | Age: 38
Discharge: HOME | End: 2018-07-12
Payer: SELF-PAY

## 2018-07-12 VITALS — DIASTOLIC BLOOD PRESSURE: 72 MMHG | SYSTOLIC BLOOD PRESSURE: 106 MMHG

## 2018-07-12 DIAGNOSIS — K08.9: Primary | ICD-10-CM

## 2018-07-12 DIAGNOSIS — Z90.710: ICD-10-CM

## 2018-07-12 DIAGNOSIS — Z90.49: ICD-10-CM

## 2018-07-12 DIAGNOSIS — M25.561: ICD-10-CM

## 2018-07-12 PROCEDURE — 3E0T3BZ INTRODUCTION OF ANESTHETIC AGENT INTO PERIPHERAL NERVES AND PLEXI, PERCUTANEOUS APPROACH: ICD-10-PCS | Performed by: NURSE PRACTITIONER

## 2018-07-12 PROCEDURE — 99283 EMERGENCY DEPT VISIT LOW MDM: CPT

## 2018-07-12 NOTE — ER DOCUMENT REPORT
HPI





- HPI


Patient complains to provider of: knee pain, dental pain


Onset: Other - 5 days


Onset/Duration: Persistent


Quality of pain: Achy


Pain Level: 4


Context: 





Patient presents complaining of right knee pain for the past 5 days.  Patient 

reports starting a new job 2 weeks ago in which he does a lot of bending and 

squatting and suspects that this increased activity has resulted in her knee 

pain.  Patient denies any traumatic injury.  Patient also complains of dental 

pain from multiple decayed and broken teeth.  Patient denies any fever or 

facial swelling.


Associated Symptoms: Other - Right knee, right upper jaw pain


Exacerbated by: Movement, Walking


Relieved by: Denies


Similar symptoms previously: Yes


Recently seen / treated by doctor: No





- ROS


ROS below otherwise negative: Yes


Systems Reviewed and Negative: Yes All other systems reviewed and negative





- CONSTITUTIONAL


Constitutional: DENIES: Fever, Chills





- EENT


Notes: 





Dental pain





- MUSCULOSKELETAL


Musculoskeletal: REPORTS: Extremity pain - right knee pain.  DENIES: Back Pain, 

Neck Pain





- DERM


Skin Color: Normal


Skin Problems: None





Past Medical History





- General


Information source: Patient





- Social History


Smoking Status: Never Smoker


Frequency of alcohol use: None


Drug Abuse: None


Occupation: Housekeeping


Family History: Reviewed & Not Pertinent


Patient has suicidal ideation: No


Patient has homicidal ideation: No


Neurological Medical History: Reports: Hx Migraine.  Denies: Hx Cerebrovascular 

Accident, Hx Seizures


Renal/ Medical History: Denies: Hx Peritoneal Dialysis


Past Surgical History: Reports: Hx Appendectomy, Hx Cholecystectomy, Hx 

Hysterectomy, Hx Tonsillectomy





Vertical Provider Document





- CONSTITUTIONAL


Agree With Documented VS: Yes


Exam Limitations: No Limitations


General Appearance: WD/WN, No Apparent Distress





- INFECTION CONTROL


TRAVEL OUTSIDE OF THE [place] IN LAST 30 DAYS: No





- HEENT


HEENT: Atraumatic, Normocephalic


Mouth Diagram: 


  __________________________














  __________________________





 1 - Widespread decay, dental fracture, gingival inflammation and swelling, no 

abscess, no trismus








- NECK


Neck: Normal Inspection





- RESPIRATORY


Respiratory: Breath Sounds Normal, No Respiratory Distress





- CARDIOVASCULAR


Cardiovascular: Regular Rate, Regular Rhythm





- BACK


Back: Normal Inspection





- MUSCULOSKELETAL/EXTREMETIES


Musculoskeletal/Extremeties: MAEW, FROM, Tender - Generalized right knee joint 

tenderness, 1+ edema, normal skin color and temperature overlying joint.  

Patellar tendon intact.  No laxity with varus or valgus maneuvers..  negative: 

Eccymosis





- NEURO


Level of Consciousness: Awake, Alert, Appropriate


Motor/Sensory: No Motor Deficit





- DERM


Integumentary: Warm, Dry, No Rash





Course





- Re-evaluation


Re-evalutation: 





07/12/18 20:53


Dental block performed on tooth #5.  Patient tolerated procedure well and 

reports improvement of pain symptoms.





- Vital Signs


Vital signs: 


 











Temp Pulse Resp BP Pulse Ox


 


 97.9 F   78   15   106/63   97 


 


 07/12/18 19:09  07/12/18 19:09  07/12/18 19:09  07/12/18 19:09  07/12/18 19:09














Procedures





- Immobilization


  ** Right Knee


Pre-Proc Neuro Vasc Exam: Normal


Immobilizer type: Ace wrap


Performed by: PCT


Post-Proc Neuro Vasc Exam: Normal


Alignment checked and good: Yes





Discharge





- Discharge


Clinical Impression: 


 Toothache





Right knee pain


Qualifiers:


 Chronicity: unspecified Qualified Code(s): M25.561 - Pain in right knee





Condition: Stable


Disposition: HOME, SELF-CARE


Instructions:  Arthritis (OM), Penicillin V K (OM), Toothache ([facility])


Additional Instructions: 


Return immediately for any new or worsening symptoms





Followup with your primary care provider, call tomorrow to make a followup 

appointment





Follow-up with orthopedics for further evaluation





Follow up with a dentist for further evaluation


Prescriptions: 


Naproxen [Naprosyn 250 Nmg Tablet] 1 tab PO BID #14 tablet


Penicillin V Potassium [Penicillin Vk 500 mg Tablet] 500 mg PO BID #20 tablet


Forms:  Return to Work


Referrals: 


[facility] [Provider Group] - Follow up as needed


[facility] [Provider Group] - Follow up as needed

## 2018-07-26 ENCOUNTER — HOSPITAL ENCOUNTER (EMERGENCY)
Dept: HOSPITAL 62 - ER | Age: 38
Discharge: HOME | End: 2018-07-26
Payer: SELF-PAY

## 2018-07-26 VITALS — SYSTOLIC BLOOD PRESSURE: 138 MMHG | DIASTOLIC BLOOD PRESSURE: 86 MMHG

## 2018-07-26 DIAGNOSIS — F17.200: ICD-10-CM

## 2018-07-26 DIAGNOSIS — Z88.6: ICD-10-CM

## 2018-07-26 DIAGNOSIS — R10.13: ICD-10-CM

## 2018-07-26 DIAGNOSIS — Z88.3: ICD-10-CM

## 2018-07-26 DIAGNOSIS — R11.2: Primary | ICD-10-CM

## 2018-07-26 LAB
ADD MANUAL DIFF: NO
ALBUMIN SERPL-MCNC: 3.9 G/DL (ref 3.5–5)
ALP SERPL-CCNC: 46 U/L (ref 38–126)
ALT SERPL-CCNC: 20 U/L (ref 9–52)
ANION GAP SERPL CALC-SCNC: 8 MMOL/L (ref 5–19)
APPEARANCE UR: (no result)
APTT PPP: YELLOW S
AST SERPL-CCNC: 30 U/L (ref 14–36)
BASOPHILS # BLD AUTO: 0 10^3/UL (ref 0–0.2)
BASOPHILS NFR BLD AUTO: 0.6 % (ref 0–2)
BILIRUB DIRECT SERPL-MCNC: 0.3 MG/DL (ref 0–0.4)
BILIRUB SERPL-MCNC: 0.4 MG/DL (ref 0.2–1.3)
BILIRUB UR QL STRIP: NEGATIVE
BUN SERPL-MCNC: 5 MG/DL (ref 7–20)
CALCIUM: 9.1 MG/DL (ref 8.4–10.2)
CHLORIDE SERPL-SCNC: 102 MMOL/L (ref 98–107)
CO2 SERPL-SCNC: 31 MMOL/L (ref 22–30)
EOSINOPHIL # BLD AUTO: 0 10^3/UL (ref 0–0.6)
EOSINOPHIL NFR BLD AUTO: 0.9 % (ref 0–6)
ERYTHROCYTE [DISTWIDTH] IN BLOOD BY AUTOMATED COUNT: 14.8 % (ref 11.5–14)
GLUCOSE SERPL-MCNC: 84 MG/DL (ref 75–110)
GLUCOSE UR STRIP-MCNC: NEGATIVE MG/DL
HCT VFR BLD CALC: 36.9 % (ref 36–47)
HGB BLD-MCNC: 12.5 G/DL (ref 12–15.5)
KETONES UR STRIP-MCNC: NEGATIVE MG/DL
LIPASE SERPL-CCNC: 14.5 U/L (ref 23–300)
LYMPHOCYTES # BLD AUTO: 2.8 10^3/UL (ref 0.5–4.7)
LYMPHOCYTES NFR BLD AUTO: 50.2 % (ref 13–45)
MCH RBC QN AUTO: 30.6 PG (ref 27–33.4)
MCHC RBC AUTO-ENTMCNC: 33.8 G/DL (ref 32–36)
MCV RBC AUTO: 91 FL (ref 80–97)
MONOCYTES # BLD AUTO: 0.3 10^3/UL (ref 0.1–1.4)
MONOCYTES NFR BLD AUTO: 5.3 % (ref 3–13)
NEUTROPHILS # BLD AUTO: 2.4 10^3/UL (ref 1.7–8.2)
NEUTS SEG NFR BLD AUTO: 43 % (ref 42–78)
NITRITE UR QL STRIP: NEGATIVE
PH UR STRIP: 7 [PH] (ref 5–9)
PLATELET # BLD: 281 10^3/UL (ref 150–450)
POTASSIUM SERPL-SCNC: 4 MMOL/L (ref 3.6–5)
PROT SERPL-MCNC: 7.4 G/DL (ref 6.3–8.2)
PROT UR STRIP-MCNC: NEGATIVE MG/DL
RBC # BLD AUTO: 4.08 10^6/UL (ref 3.72–5.28)
SODIUM SERPL-SCNC: 141.1 MMOL/L (ref 137–145)
SP GR UR STRIP: 1.01
TOTAL CELLS COUNTED % (AUTO): 100 %
UROBILINOGEN UR-MCNC: NEGATIVE MG/DL (ref ?–2)
WBC # BLD AUTO: 5.6 10^3/UL (ref 4–10.5)

## 2018-07-26 PROCEDURE — 36415 COLL VENOUS BLD VENIPUNCTURE: CPT

## 2018-07-26 PROCEDURE — 99284 EMERGENCY DEPT VISIT MOD MDM: CPT

## 2018-07-26 PROCEDURE — 80076 HEPATIC FUNCTION PANEL: CPT

## 2018-07-26 PROCEDURE — 80048 BASIC METABOLIC PNL TOTAL CA: CPT

## 2018-07-26 PROCEDURE — 96375 TX/PRO/DX INJ NEW DRUG ADDON: CPT

## 2018-07-26 PROCEDURE — 83690 ASSAY OF LIPASE: CPT

## 2018-07-26 PROCEDURE — 74022 RADEX COMPL AQT ABD SERIES: CPT

## 2018-07-26 PROCEDURE — 81001 URINALYSIS AUTO W/SCOPE: CPT

## 2018-07-26 PROCEDURE — 96372 THER/PROPH/DIAG INJ SC/IM: CPT

## 2018-07-26 PROCEDURE — 96361 HYDRATE IV INFUSION ADD-ON: CPT

## 2018-07-26 PROCEDURE — 81025 URINE PREGNANCY TEST: CPT

## 2018-07-26 PROCEDURE — 85025 COMPLETE CBC W/AUTO DIFF WBC: CPT

## 2018-07-26 PROCEDURE — 96374 THER/PROPH/DIAG INJ IV PUSH: CPT

## 2018-07-26 NOTE — ER DOCUMENT REPORT
ED GI/





- General


Chief Complaint: Nausea/Vomiting


Stated Complaint: VOMITING


Time Seen by Provider: 07/26/18 13:20


Information source: Patient


Notes: 





Patient is a 37-year-old female with 3 days of nonradiating intermittent 

epigastric abdominal discomfort.  She states nausea and vomiting without 

diarrhea.  She denies any fevers, chest pain, cough, or rash.  Patient denies 

any sick contacts.  She denies any recent trips or travel.  Patient has a 

cholecystectomy around 10 years ago with an appendectomy around 5 years ago.  

She denies any other abdominal pathology or obstructions.  She denies a history 

of pancreatitis or heavy alcohol drinking.  Patient is on a methadone clinic 

regimen but denies missing any dosages.


TRAVEL OUTSIDE OF THE [place] IN LAST 30 DAYS: No





- HPI


Patient complains to provider of: Other - See above


Onset: Other - See above


Timing/Duration: Gradual


Quality of pain: Achy, Burning


Severity at maximum: Moderate


Severity in ED: Mild, Almost gone


Pain Level: 1


Location: Other - See above


Associated symptoms: Other - See above


Exacerbated by: Denies


Relieved by: Denies


Similar symptoms previously: No


Recently seen / treated by doctor: No





- Related Data


Allergies/Adverse Reactions: 


 





ondansetron [From Zofran (as hydrochloride)] Allergy (Intermediate, Verified 04/ 26/18 08:54)


 Hives


acetaminophen [From Darvocet-N] Allergy (Verified 04/26/18 08:54)


 


meperidine [From Demerol] Allergy (Verified 04/26/18 08:54)


 


metoclopramide [From Reglan] Allergy (Verified 04/26/18 08:54)


 


propoxyphene [From Darvocet-N] Allergy (Verified 04/26/18 08:54)


 


erythromycin base [From E-Mycin] Adverse Reaction (Verified 04/26/18 08:54)


 











Past Medical History





- General


Information source: Patient





- Social History


Smoking Status: Current Every Day Smoker


Cigarette use (# per day): No


Chew tobacco use (# tins/day): No


Smoking Education Provided: No


Frequency of alcohol use: None


Drug Abuse: None


Family History: Reviewed & Not Pertinent


Patient has suicidal ideation: No


Patient has homicidal ideation: No





- Past Medical History


Cardiac Medical History: 


   Denies: Hx Atrial Fibrillation, Hx Congestive Heart Failure, Hx Coronary 

Artery Disease, Hx DVT, Hx Heart Attack, Hx Hypercholesterolemia, Hx 

Hypertension, Hx Peripheral Vascular Disease, Hx Pulmonary Embolism, Hx Heart 

Murmur


Pulmonary Medical History: 


   Denies: Hx Asthma, Hx Bronchitis, Hx COPD, Hx Pneumonia, Hx Intubation, Hx 

Respiratory Failure, Hx Sleep Apnea, Hx Tuberculosis


Neurological Medical History: Reports: Hx Migraine.  Denies: Hx Cerebrovascular 

Accident, Hx Seizures


Endocrine Medical History: Denies: Hx Diabetes Mellitus Type 1, Hx Diabetes 

Mellitus Type 2, Hx Graves' Disease, Hx Hyperthyroidism, Hx Hypothyroidism


Renal/ Medical History: Denies: Hx Peritoneal Dialysis


Past Surgical History: Reports: Hx Appendectomy, Hx Cholecystectomy, Hx 

Hysterectomy, Hx Tonsillectomy





Review of Systems





- Review of Systems


Constitutional: denies: Fever


EENT: denies: Eye discharge, Nose discharge


Cardiovascular: denies: Chest pain, Palpitations


Respiratory: denies: Short of breath


Gastrointestinal: Vomiting.  denies: Diarrhea


Genitourinary: denies: Dysuria


Musculoskeletal: denies: Leg swelling


Skin: Other - no hives.  denies: Rash


Neurological/Psychological: Other - no slurred speech


-: Yes All other systems reviewed and negative





Physical Exam





- Vital signs


Vitals: 


 











Temp Pulse Resp BP Pulse Ox


 


 98.4 F   71   20   140/99 H  95 


 


 07/26/18 12:54  07/26/18 12:54  07/26/18 12:54  07/26/18 12:54  07/26/18 12:54











Notes: 





Reviewed vital signs and nursing note as charted by RN.





CONSTITUTIONAL: Alert and oriented and responds appropriately to questions. Well

-appearing; well-nourished





HEAD: Normocephalic; atraumatic





EYES: Sclerae non-icteric





ENT: Moist mucous membranes; pharynx without lesions noted





NECK: Supple without meningismus; non-tender; no cervical lymphadenopathy, no 

masses





CARD: Regular rate and rhythm; no murmurs





RESP: Normal chest excursion without splinting or tachypnea; breath sounds 

clear and equal bilaterally





ABD/GI: Normal bowel sounds; non-distended; soft, minimally tender to palpation 

in the midepigastric region without any rebound or guarding.  No palpable 

masses or abdominal bruits present





BACK: The back appears normal and is non-tender to palpation, there is no CVA 

tenderness





EXT: Normal ROM in all joints; non-tender to palpation; no edema





SKIN: Normal color for age and race; warm; dry; good turgor; no acute lesions 

noted





NEURO: Moves all extremities equally; Motor and sensory function intact





PSYCH: The patient's mood and manner are appropriate. Grooming and personal 

hygiene are appropriate.





Course





- Re-evaluation


Re-evalutation: 





07/26/18 16:12


Given the above history and physical examination we will order belly labs, three

-way x-ray of the abdomen, and provide fluid and antinausea medications.  

Patient does not have a gallbladder or an appendix.  Abdominal examination as 

recorded.  Patient denies any chest pain or shortness of breath.





07/26/18 17:26


Patient's pain and nausea has improved.  She has not vomited here at this 

facility.





Three-way x-ray of the abdomen shows no obvious obstruction or acute findings.








07/26/18 17:54


Labs as recorded.  Patient feels much improved.  Still no lower abdominal 

tenderness.  I will prescribe a short course of Phenergan with strict return 

precautions.  I discussed with the patient over-the-counter Prilosec and have 

provided a GI follow-up.





- Vital Signs


Vital signs: 


 











Temp Pulse Resp BP Pulse Ox


 


 98.4 F   71   20   140/99 H  95 


 


 07/26/18 12:54  07/26/18 12:54  07/26/18 12:54  07/26/18 12:54  07/26/18 12:54














- Laboratory


Result Diagrams: 


 07/26/18 15:35





 07/26/18 15:35


Laboratory results interpreted by me: 


 











  07/26/18 07/26/18 07/26/18





  13:51 15:35 15:35


 


RDW   14.8 H 


 


Lymphocytes %   50.2 H 


 


Carbon Dioxide    31 H


 


BUN    5 L


 


Lipase    14.5 L


 


Ur Leukocyte Esterase  SMALL H  














Discharge





- Discharge


Clinical Impression: 


 Epigastric abdominal pain





Vomiting


Qualifiers:


 Vomiting type: unspecified Vomiting Intractability: non-intractable Nausea 

presence: with nausea Qualified Code(s): R11.2 - Nausea with vomiting, 

unspecified





Condition: Good


Disposition: HOME, SELF-CARE


Additional Instructions: 


Come back immediately for any return of pain, worsening pain, change in 

location or quality of pain, repeat vomiting, fevers, or any other acute 

problems.  Please make sure that you follow-up with the primary care physician 

as we have discussed, possibly the gastroenterologist, and start Prilosec once 

daily..


Prescriptions: 


Promethazine HCl [Phenergan 25 mg Tablet] 25 mg PO Q6H PRN #15 tablet


 PRN Reason: 


Referrals: 


MARYAN OMALLEY MD [ACTIVE STAFF] - Follow up as needed

## 2018-07-26 NOTE — ER DOCUMENT REPORT
ED Medical Screen (RME)





- General


Chief Complaint: Nausea/Vomiting


Stated Complaint: VOMITING


Time Seen by Provider: 07/26/18 13:20


Notes: 





37 years old female who is on methadone program,


Presents today with 3 day history of nausea vomiting and upper abdominal pain.  

With a history of cholecystectomy.


The pain is centered around the epigastrium nonradiating.  Not associated with 

diarrhea or constipation.


Denies any fever chills.  Denies any dysuria frequency urgency.


TRAVEL OUTSIDE OF THE [place] IN LAST 30 DAYS: No





- Related Data


Allergies/Adverse Reactions: 


 





ondansetron [From Zofran (as hydrochloride)] Allergy (Intermediate, Verified 04/ 26/18 08:54)


 Hives


acetaminophen [From Darvocet-N] Allergy (Verified 04/26/18 08:54)


 


meperidine [From Demerol] Allergy (Verified 04/26/18 08:54)


 


metoclopramide [From Reglan] Allergy (Verified 04/26/18 08:54)


 


propoxyphene [From Darvocet-N] Allergy (Verified 04/26/18 08:54)


 


erythromycin base [From E-Mycin] Adverse Reaction (Verified 04/26/18 08:54)


 











Past Medical History





- Social History


Chew tobacco use (# tins/day): No


Frequency of alcohol use: None


Drug Abuse: None





- Past Medical History


Cardiac Medical History: 


   Denies: Hx Atrial Fibrillation, Hx Congestive Heart Failure, Hx Coronary 

Artery Disease, Hx DVT, Hx Heart Attack, Hx Hypercholesterolemia, Hx 

Hypertension, Hx Peripheral Vascular Disease, Hx Pulmonary Embolism, Hx Heart 

Murmur


Pulmonary Medical History: 


   Denies: Hx Asthma, Hx Bronchitis, Hx COPD, Hx Pneumonia, Hx Intubation, Hx 

Respiratory Failure, Hx Sleep Apnea, Hx Tuberculosis


Neurological Medical History: Reports: Hx Migraine.  Denies: Hx Cerebrovascular 

Accident, Hx Seizures


Endocrine Medical History: Denies: Hx Diabetes Mellitus Type 1, Hx Diabetes 

Mellitus Type 2, Hx Graves' Disease, Hx Hyperthyroidism, Hx Hypothyroidism


Renal/ Medical History: Denies: Hx Peritoneal Dialysis


Past Surgical History: Reports: Hx Appendectomy, Hx Cholecystectomy, Hx 

Hysterectomy, Hx Tonsillectomy





Physical Exam





- Vital signs


Vitals: 


 











Temp Pulse Resp BP Pulse Ox


 


 98.4 F   71   20   140/99 H  95 


 


 07/26/18 12:54  07/26/18 12:54  07/26/18 12:54  07/26/18 12:54  07/26/18 12:54














Course





- Vital Signs


Vital signs: 


 











Temp Pulse Resp BP Pulse Ox


 


 98.4 F   71   20   140/99 H  95 


 


 07/26/18 12:54  07/26/18 12:54  07/26/18 12:54  07/26/18 12:54  07/26/18 12:54

## 2018-07-26 NOTE — RADIOLOGY REPORT (SQ)
EXAM DESCRIPTION:  ACUTE ABDOMEN SERIES



COMPLETED DATE/TIME:  7/26/2018 1:54 pm



REASON FOR STUDY:  Acute abdominal pain



COMPARISON:  2/26/2018.



NUMBER OF VIEWS:  Three views.



TECHNIQUE:  Frontal chest, supine abdomen and upright/decubitus abdomen radiographic images acquired.




LIMITATIONS:  None.



FINDINGS:  CHEST: Lungs clear of infiltrates.

FREE AIR: None. No abnormal gas collections.

BOWEL GAS PATTERN: Nonobstructive pattern. No dilated loops or air fluid levels.

CALCIFICATIONS: No suspicious calcifications.

HARDWARE: Surgical clips.

SOFT TISSUES: No gross mass or suggestion of organomegaly.

BONES: No acute fracture.  No worrisome bone lesions.

OTHER: No other significant finding.



IMPRESSION:  NO RADIOGRAPHIC EVIDENCE FOR ACUTE ABDOMINAL DISEASE.



TECHNICAL DOCUMENTATION:  JOB ID:  [id number]

 2011 [employer]- All Rights Reserved



Reading location - IP/workstation name: [email or web address]

## 2018-08-02 ENCOUNTER — HOSPITAL ENCOUNTER (EMERGENCY)
Dept: HOSPITAL 62 - ER | Age: 38
Discharge: HOME | End: 2018-08-02
Payer: SELF-PAY

## 2018-08-02 VITALS — SYSTOLIC BLOOD PRESSURE: 128 MMHG | DIASTOLIC BLOOD PRESSURE: 76 MMHG

## 2018-08-02 DIAGNOSIS — K08.89: ICD-10-CM

## 2018-08-02 DIAGNOSIS — K02.9: Primary | ICD-10-CM

## 2018-08-02 DIAGNOSIS — K05.10: ICD-10-CM

## 2018-08-02 DIAGNOSIS — F17.200: ICD-10-CM

## 2018-08-02 PROCEDURE — 99282 EMERGENCY DEPT VISIT SF MDM: CPT

## 2018-08-02 NOTE — ER DOCUMENT REPORT
HPI





- HPI


Patient complains to provider of: Mouth pain


Onset: Yesterday


Pain Level: 5


Context: 





37-year-old female with extensive dental decay and gingivitis is complaining of 

top left increased pain due to part of her tooth breaking off while she was 

eating Doritos last night.  No fever or facial swelling.


Associated Symptoms: None


Exacerbated by: Other - Chewing


Relieved by: Denies





- ROS


ROS below otherwise negative: Yes


Systems Reviewed and Negative: Yes All other systems reviewed and negative





Past Medical History





- General


Information source: Patient





- Social History


Smoking Status: Current Every Day Smoker


Frequency of alcohol use: None


Drug Abuse: None


Lives with: Family


Family History: Reviewed & Not Pertinent


Neurological Medical History: Reports: Hx Migraine


Past Surgical History: Reports: Hx Appendectomy, Hx Cholecystectomy, Hx 

Hysterectomy, Hx Tonsillectomy





Vertical Provider Document





- CONSTITUTIONAL


Agree With Documented VS: Yes





- INFECTION CONTROL


TRAVEL OUTSIDE OF THE [place] IN LAST 30 DAYS: No





- HEENT


Notes: 





Extensive dental decay and gingivitis especially on the top left with inflamed 

gingiva.  No palpated abscess.





- NECK


Neck: Supple.  negative: Lymphadenopathy-Left, Lymphadenopathy-Right





- NEURO


Level of Consciousness: Alert





- DERM


Integumentary: No Rash





Course





- Vital Signs


Vital signs: 


 











Temp Pulse Resp BP Pulse Ox


 


 98.3 F   73   18   128/76 H  97 


 


 08/02/18 12:51  08/02/18 12:51  08/02/18 12:51  08/02/18 12:51  08/02/18 12:51














Discharge





- Discharge


Clinical Impression: 


 Extensive dental decay and gingivitis, Mouth pain





Condition: Good


Disposition: HOME, SELF-CARE


Instructions:  Dentist, Dental Infection or Abscess ([facility]), Topical Lidocaine (

[facility]), Tessalon Perles ([facility]), Acetaminophen, Ibuprofen (General) ([facility]), 

Penicillin V K ([facility])


Additional Instructions: 


See the dentist


Topical lidocaine to numb the area


Tylenol up to 4000 mg per day for pain


Motrin 400 mg every 4-6 hours for pain


Antibiotics as prescribed


Return to the emergency room for any facial swelling fever or worsening of the 

pain


Prescriptions: 


Penicillin V Potassium [Penicillin Vk 500 mg Tablet] 500 mg PO QID #40 tablet

## 2023-05-31 NOTE — ER DOCUMENT REPORT
Authorization form was completed, forms sent to disability.  Original placard form will be given to patient at 6/6/23 appointment.   ED General





- General


Chief Complaint: Nausea/Vomiting


Stated Complaint: VOMITING


Time Seen by Provider: 01/05/18 15:30


Mode of Arrival: Ambulatory


Information source: Patient


Notes: 





37-year-old female presents with complaints of nausea vomiting of one day 

duration.  Patient denies any fevers chills denies any diarrhea patient denies 

any abdominal pain at all


TRAVEL OUTSIDE OF THE [place] IN LAST 30 DAYS: No





- HPI


Onset: Other


Onset/Duration: Persistent


Quality of pain: No pain


Severity: Mild


Pain Level: Denies


Associated symptoms: Nausea, Vomiting


Exacerbated by: Food


Relieved by: Denies


Similar symptoms previously: No


Recently seen / treated by doctor: No





- Related Data


Allergies/Adverse Reactions: 


 





ondansetron [From Zofran (as hydrochloride)] Allergy (Intermediate, Verified 01/ 05/18 16:23)


 Hives


acetaminophen [From Darvocet-N] Allergy (Verified 01/05/18 14:27)


 


meperidine [From Demerol] Allergy (Verified 01/05/18 14:27)


 


metoclopramide [From Reglan] Allergy (Verified 01/05/18 14:27)


 


propoxyphene [From Darvocet-N] Allergy (Verified 01/05/18 14:27)


 


erythromycin base [From E-Mycin] Adverse Reaction (Verified 01/05/18 14:27)


 











Past Medical History





- Social History


Smoking Status: Never Smoker


Cigarette use (# per day): No


Chew tobacco use (# tins/day): No


Smoking Education Provided: No


Frequency of alcohol use: None


Drug Abuse: Marijuana


Family History: Reviewed & Not Pertinent


Patient has suicidal ideation: No


Patient has homicidal ideation: No


Neurological Medical History: Reports: Hx Migraine


Renal/ Medical History: Denies: Hx Peritoneal Dialysis


Past Surgical History: Reports: Hx Appendectomy, Hx Cholecystectomy, Hx 

Hysterectomy, Hx Tonsillectomy





Review of Systems





- Review of Systems


Notes: 





REVIEW OF SYSTEMS:


CONSTITUTIONAL :  Denies fever,  chills, or sweats.  Denies recent illness.


EENT:   Denies eye, ear, throat, or mouth pain or symptoms.  Denies nasal or 

sinus congestion or discharge.  Denies throat, tongue, or mouth swelling or 

difficulty swallowing.


CARDIOVASCULAR:  Denies chest pain.  Denies palpitations or racing or irregular 

heart beat.  Denies ankle edema.


RESPIRATORY:  Denies cough, cold, or chest congestion.  Denies shortness of 

breath, difficulty breathing, or wheezing.


GASTROINTESTINAL:  Denies abdominal pain or distention.  Denies nausea, vomiting

, or diarrhea.  Denies blood in vomitus, stools, or per rectum.  Denies black, 

tarry stools.  Denies constipation. 


GENITOURINARY:  Denies difficulty urinating, painful urination, burning, 

frequency, blood in urine, or discharge.


FEMALE  GENITOURINARY:  Denies vaginal bleeding, heavy or abnormal periods, 

irregular periods.  Denies vaginal discharge or odor. 


MUSCULOSKELETAL:  Denies back or neck pain or stiffness.  Denies joint pain or 

swelling.


SKIN:   Denies rash, lesions or sores.


HEMATOLOGIC :   Denies easy bruising or bleeding.


LYMPHATIC:  Denies swollen, enlarged glands.


NEUROLOGICAL:  Denies confusion or altered mental status.  Denies passing out 

or loss of consciousness.  Denies dizziness or lightheadedness.  Denies 

headache.  Denies weakness or paralysis or loss of use of either side.  Denies 

problems with gait or speech.  Denies sensory loss, numbness, or tingling.  

Denies seizures.


PSYCHIATRIC:  Denies anxiety or stress.  Denies depression, suicidal ideation, 

or homicidal ideation.





ALL OTHER SYSTEMS REVIEWED AND NEGATIVE.











PHYSICAL EXAMINATION:





GENERAL: Well-appearing, well-nourished and in no acute distress.





HEAD: Atraumatic, normocephalic.





EYES: Pupils equal round and reactive to light, extraocular movements intact, 

conjunctiva are normal.





ENT: Nares patent, oropharynx clear without exudates.  Moist mucous membranes.





NECK: Normal range of motion, supple without lymphadenopathy





LUNGS: Breath sounds clear to auscultation bilaterally and equal.  No wheezes 

rales or rhonchi.





HEART: Regular rate and rhythm without murmurs





ABDOMEN: Soft, nontender, nondistended abdomen.  No guarding, no rebound.  No 

masses appreciated.





Female : deferred





Musculoskeletal: Normal range of motion, no pitting or edema.  No cyanosis.





NEUROLOGICAL: Cranial nerves grossly intact.  Normal speech, normal gait.  

Normal sensory, motor exams





PSYCH: Normal mood, normal affect.





SKIN: Scar tissue noted from multiple previous IV drug use














Dictation was performed using Dragon voice recognition software





Physical Exam





- Vital signs


Vitals: 


 











Temp Pulse Resp BP Pulse Ox


 


 97.9 F   77   18   117/84   97 


 


 01/05/18 14:35  01/05/18 14:35  01/05/18 14:35  01/05/18 14:35  01/05/18 14:35














Course





- Re-evaluation


Re-evalutation: 





01/05/18 17:48


Patient has been reevaluated multiple times, she stated she was nauseous 

initially but has not vomited one time in the ED, she was given multiple 

medications IV fluids and was watched for 3-1/2 hours and is stable for 

discharge








After performing a Medical Screening Examination, I estimate there is LOW risk 

for ACUTE APPENDICITIS, BOWEL OBSTRUCTION, ACUTE CHOLECYSTITIS, PERFORATED 

DIVERTICULITIS, INCARCERATED HERNIA, PANCREATITIS, PELVIC INFLAMMATORY DISEASE, 

PERFORATED ULCER, ECTOPIC PREGNANCY, or TUBO-OVARIAN ABSCESS, thus I consider 

the discharge disposition reasonable. Also, there is no evidence or peritonitis

, sepsis, or toxicity. I have reevaluated this patient multiple times and no 

significant life threatening changes are noted. The patient and I have 

discussed the diagnosis and risks, and we agree with discharging home with 

close follow-up with the understanding that symptoms and presentations can 

change. We also discussed returning to the Emergency Department immediately if 

new or worsening symptoms occur. We have discussed the symptoms which are most 

concerning (e.g., bloody stool, fever, changing or worsening pain, vomiting) 

that necessitate immediate return.


01/05/18 21:14








Patient had admitted to me that she was a previous IV drug user that she now is 

on methadone however it is noted that she had a pocket full of needles and 

syringes that she had taken from the drawer





- Vital Signs


Vital signs: 


 











Temp Pulse Resp BP Pulse Ox


 


 97.9 F   77   18   117/84   97 


 


 01/05/18 14:35  01/05/18 14:35  01/05/18 15:27  01/05/18 14:35  01/05/18 14:35














- Laboratory


Result Diagrams: 


 01/05/18 16:08





 01/05/18 16:08


Laboratory results interpreted by me: 


 











  01/05/18 01/05/18





  16:08 16:08


 


RDW  14.3 H 


 


Calcium   10.8 H


 


Total Protein   9.1 H














Discharge





- Discharge


Clinical Impression: 


Nausea & vomiting


Qualifiers:


 Vomiting type: unspecified Vomiting Intractability: non-intractable Qualified 

Code(s): R11.2 - Nausea with vomiting, unspecified





Condition: Stable


Disposition: HOME, SELF-CARE


Instructions:  Vomiting ([facility])


Additional Instructions: 


Follow up with your physician tomorrow for further care or return to the ED 

IMMEDIATELY if symptoms worsen or new concerns occur. If you cannot afford to 

follow up with your primary care physician a list of low cost clinics have been 

provided at the end of your discharge papers as well.


Prescriptions: 


Promethazine HCl [Promethegan] 50 mg RC Q6 #14 supp.rect